# Patient Record
Sex: FEMALE | Race: BLACK OR AFRICAN AMERICAN | NOT HISPANIC OR LATINO | Employment: FULL TIME | URBAN - METROPOLITAN AREA
[De-identification: names, ages, dates, MRNs, and addresses within clinical notes are randomized per-mention and may not be internally consistent; named-entity substitution may affect disease eponyms.]

---

## 2017-02-16 ENCOUNTER — COMMUNICATION - HEALTHEAST (OUTPATIENT)
Dept: SCHEDULING | Facility: CLINIC | Age: 32
End: 2017-02-16

## 2017-02-17 ENCOUNTER — OFFICE VISIT - HEALTHEAST (OUTPATIENT)
Dept: FAMILY MEDICINE | Facility: CLINIC | Age: 32
End: 2017-02-17

## 2017-02-17 DIAGNOSIS — R11.0 NAUSEA: ICD-10-CM

## 2017-02-17 DIAGNOSIS — R42 DIZZINESS: ICD-10-CM

## 2018-10-18 ENCOUNTER — OFFICE VISIT - HEALTHEAST (OUTPATIENT)
Dept: FAMILY MEDICINE | Facility: CLINIC | Age: 33
End: 2018-10-18

## 2018-10-18 DIAGNOSIS — Z00.00 ROUTINE MEDICAL EXAM: ICD-10-CM

## 2018-10-18 DIAGNOSIS — Z23 IMMUNIZATION DUE: ICD-10-CM

## 2018-10-18 ASSESSMENT — MIFFLIN-ST. JEOR: SCORE: 1385.94

## 2018-11-26 ENCOUNTER — COMMUNICATION - HEALTHEAST (OUTPATIENT)
Dept: FAMILY MEDICINE | Facility: CLINIC | Age: 33
End: 2018-11-26

## 2018-11-30 ENCOUNTER — AMBULATORY - HEALTHEAST (OUTPATIENT)
Dept: FAMILY MEDICINE | Facility: CLINIC | Age: 33
End: 2018-11-30

## 2018-11-30 ENCOUNTER — AMBULATORY - HEALTHEAST (OUTPATIENT)
Dept: NURSING | Facility: CLINIC | Age: 33
End: 2018-11-30

## 2018-11-30 ENCOUNTER — COMMUNICATION - HEALTHEAST (OUTPATIENT)
Dept: INTERNAL MEDICINE | Facility: CLINIC | Age: 33
End: 2018-11-30

## 2018-11-30 DIAGNOSIS — Z11.1 TUBERCULOSIS SCREENING: ICD-10-CM

## 2018-11-30 DIAGNOSIS — Z11.1 SCREENING EXAMINATION FOR PULMONARY TUBERCULOSIS: ICD-10-CM

## 2018-12-03 ENCOUNTER — AMBULATORY - HEALTHEAST (OUTPATIENT)
Dept: LAB | Facility: CLINIC | Age: 33
End: 2018-12-03

## 2018-12-03 DIAGNOSIS — Z11.1 SCREENING EXAMINATION FOR PULMONARY TUBERCULOSIS: ICD-10-CM

## 2018-12-05 LAB
GAMMA INTERFERON BACKGROUND BLD IA-ACNC: 0.08 IU/ML
M TB IFN-G BLD-IMP: POSITIVE
MITOGEN IGNF BCKGRD COR BLD-ACNC: 3.17 IU/ML
MITOGEN IGNF BCKGRD COR BLD-ACNC: 3.73 IU/ML
QTF INTERPRETATION: ABNORMAL
QTF MITOGEN - NIL: 6.65 IU/ML

## 2018-12-06 ENCOUNTER — COMMUNICATION - HEALTHEAST (OUTPATIENT)
Dept: FAMILY MEDICINE | Facility: CLINIC | Age: 33
End: 2018-12-06

## 2018-12-06 DIAGNOSIS — Z11.1 SCREENING EXAMINATION FOR PULMONARY TUBERCULOSIS: ICD-10-CM

## 2018-12-06 DIAGNOSIS — R76.12 POSITIVE QUANTIFERON-TB GOLD TEST: ICD-10-CM

## 2019-03-26 ENCOUNTER — AMBULATORY - HEALTHEAST (OUTPATIENT)
Dept: NURSING | Facility: CLINIC | Age: 34
End: 2019-03-26

## 2019-03-26 DIAGNOSIS — Z23 NEED FOR VACCINATION: ICD-10-CM

## 2020-02-17 ENCOUNTER — COMMUNICATION - HEALTHEAST (OUTPATIENT)
Dept: SCHEDULING | Facility: CLINIC | Age: 35
End: 2020-02-17

## 2020-02-28 ENCOUNTER — OFFICE VISIT - HEALTHEAST (OUTPATIENT)
Dept: FAMILY MEDICINE | Facility: CLINIC | Age: 35
End: 2020-02-28

## 2020-02-28 DIAGNOSIS — Z13.220 SCREENING FOR HYPERLIPIDEMIA: ICD-10-CM

## 2020-02-28 DIAGNOSIS — Z12.4 SCREENING FOR CERVICAL CANCER: ICD-10-CM

## 2020-02-28 DIAGNOSIS — Z01.419 WELL FEMALE EXAM WITH ROUTINE GYNECOLOGICAL EXAM: ICD-10-CM

## 2020-02-28 DIAGNOSIS — Z30.44 ENCOUNTER FOR SURVEILLANCE OF VAGINAL RING HORMONAL CONTRACEPTIVE DEVICE: ICD-10-CM

## 2020-02-28 DIAGNOSIS — Z22.7 LATENT TUBERCULOSIS: ICD-10-CM

## 2020-02-28 DIAGNOSIS — Z13.0 SCREENING FOR DEFICIENCY ANEMIA: ICD-10-CM

## 2020-02-28 DIAGNOSIS — Z13.1 SCREENING FOR DIABETES MELLITUS: ICD-10-CM

## 2020-02-28 LAB
CHOLEST SERPL-MCNC: 150 MG/DL
FASTING STATUS PATIENT QL REPORTED: NO
FASTING STATUS PATIENT QL REPORTED: NO
GLUCOSE BLD-MCNC: 97 MG/DL (ref 74–125)
HDLC SERPL-MCNC: 68 MG/DL
HGB BLD-MCNC: 11.6 G/DL (ref 12–16)
LDLC SERPL CALC-MCNC: 74 MG/DL
TRIGL SERPL-MCNC: 41 MG/DL

## 2020-02-28 RX ORDER — ETONOGESTREL AND ETHINYL ESTRADIOL VAGINAL RING .015; .12 MG/D; MG/D
RING VAGINAL
Qty: 3 EACH | Refills: 4 | Status: SHIPPED | OUTPATIENT
Start: 2020-02-28 | End: 2021-09-02

## 2020-02-28 ASSESSMENT — MIFFLIN-ST. JEOR: SCORE: 1366.6

## 2020-03-02 LAB
GAMMA INTERFERON BACKGROUND BLD IA-ACNC: 0.15 IU/ML
HPV SOURCE: NORMAL
HUMAN PAPILLOMA VIRUS 16 DNA: NEGATIVE
HUMAN PAPILLOMA VIRUS 18 DNA: NEGATIVE
HUMAN PAPILLOMA VIRUS FINAL DIAGNOSIS: NORMAL
HUMAN PAPILLOMA VIRUS OTHER HR: NEGATIVE
M TB IFN-G BLD-IMP: POSITIVE
MITOGEN IGNF BCKGRD COR BLD-ACNC: 1.87 IU/ML
MITOGEN IGNF BCKGRD COR BLD-ACNC: 1.91 IU/ML
QTF INTERPRETATION: ABNORMAL
QTF MITOGEN - NIL: 4.31 IU/ML
SPECIMEN DESCRIPTION: NORMAL

## 2020-03-06 LAB
BKR LAB AP ABNORMAL BLEEDING: NO
BKR LAB AP BIRTH CONTROL/HORMONES: NORMAL
BKR LAB AP CERVICAL APPEARANCE: NORMAL
BKR LAB AP GYN ADEQUACY: NORMAL
BKR LAB AP GYN INTERPRETATION: NORMAL
BKR LAB AP HPV REFLEX: NORMAL
BKR LAB AP LMP: NORMAL
BKR LAB AP PATIENT STATUS: NORMAL
BKR LAB AP PREVIOUS ABNORMAL: NO
BKR LAB AP PREVIOUS NORMAL: 2013
HIGH RISK?: NO
PATH REPORT.COMMENTS IMP SPEC: NORMAL
RESULT FLAG (HE HISTORICAL CONVERSION): NORMAL

## 2020-03-19 ENCOUNTER — COMMUNICATION - HEALTHEAST (OUTPATIENT)
Dept: HEALTH INFORMATION MANAGEMENT | Facility: CLINIC | Age: 35
End: 2020-03-19

## 2020-03-20 ENCOUNTER — COMMUNICATION - HEALTHEAST (OUTPATIENT)
Dept: FAMILY MEDICINE | Facility: CLINIC | Age: 35
End: 2020-03-20

## 2020-04-22 ENCOUNTER — RECORDS - HEALTHEAST (OUTPATIENT)
Dept: ADMINISTRATIVE | Facility: OTHER | Age: 35
End: 2020-04-22

## 2020-04-23 ENCOUNTER — RECORDS - HEALTHEAST (OUTPATIENT)
Dept: ADMINISTRATIVE | Facility: OTHER | Age: 35
End: 2020-04-23

## 2020-05-28 ENCOUNTER — RECORDS - HEALTHEAST (OUTPATIENT)
Dept: ADMINISTRATIVE | Facility: OTHER | Age: 35
End: 2020-05-28

## 2020-07-07 ENCOUNTER — COMMUNICATION - HEALTHEAST (OUTPATIENT)
Dept: SCHEDULING | Facility: CLINIC | Age: 35
End: 2020-07-07

## 2020-07-09 ENCOUNTER — COMMUNICATION - HEALTHEAST (OUTPATIENT)
Dept: FAMILY MEDICINE | Facility: CLINIC | Age: 35
End: 2020-07-09

## 2020-07-10 ENCOUNTER — COMMUNICATION - HEALTHEAST (OUTPATIENT)
Dept: ADMINISTRATIVE | Facility: CLINIC | Age: 35
End: 2020-07-10

## 2020-09-09 ENCOUNTER — COMMUNICATION - HEALTHEAST (OUTPATIENT)
Dept: SCHEDULING | Facility: CLINIC | Age: 35
End: 2020-09-09

## 2021-05-30 VITALS — WEIGHT: 155.2 LBS | BODY MASS INDEX: 25.83 KG/M2

## 2021-06-02 ENCOUNTER — RECORDS - HEALTHEAST (OUTPATIENT)
Dept: ADMINISTRATIVE | Facility: CLINIC | Age: 36
End: 2021-06-02

## 2021-06-02 VITALS — WEIGHT: 158.06 LBS | BODY MASS INDEX: 26.98 KG/M2 | HEIGHT: 64 IN

## 2021-06-04 VITALS
SYSTOLIC BLOOD PRESSURE: 104 MMHG | HEART RATE: 60 BPM | BODY MASS INDEX: 26.26 KG/M2 | RESPIRATION RATE: 17 BRPM | WEIGHT: 153.8 LBS | HEIGHT: 64 IN | DIASTOLIC BLOOD PRESSURE: 56 MMHG

## 2021-06-06 NOTE — TELEPHONE ENCOUNTER
New Appointment Needed  What is the reason for the visit:    Mantoux Placement  Appt Request  What is the purpose of the mantoux?:  School: [employer]  Is there a form to be completed?:   No  How soon do you need the mantoux placed?:  date: asap    Provider Preference: Any available  How soon do you need to be seen?: tomorrow  Waitlist offered?: Yes  Okay to leave a detailed message:  Yes
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
## 2021-06-06 NOTE — TELEPHONE ENCOUNTER
Left message to call back for: Unable to leave vm, Phone number listed is currently not taking any calls at the moment    Information to relay to patient:  If Pt calls back, please help the Pt schedule an appointment either OV or PHY with a provider.  Pt needs to see a provider in order to get TB test completed. Pt has not seen a provider since 10/18/2018.

## 2021-06-08 NOTE — PROGRESS NOTES
Assessment:     1. Dizziness     2. Nausea  Pregnancy (Beta-hCG, Qual), Urine     Results for orders placed or performed in visit on 02/17/17   Pregnancy (Beta-hCG, Qual), Urine   Result Value Ref Range    Pregnancy Test, Urine Negative Negative    Specific Gravity, UA 1.010 1.001 - 1.030     Normal neuro exam, neg ar hallpike. No medications. No HA currently, not concerning for ACS or stroke. She has had nasal congestion recently and I suspect this is potentially the source of dizziness, low grade temps and maybe nausea.      Plan:   Dizziness, Headaches  Reassured she is not pregnant  Push fluids  Recommend hot steamy shower, saline nasal spray to lessen congestion  Follow up with Dr. Frost in 1 wk if symptoms are ongoing or sooner if any new or worsening symptoms.       Subjective:       Kali Laureano is a 32 y.o. female who presents for evaluation of nausea, dizziness, low grade temps with HA.  Onset of symptoms was 3 weeks ago, and have been unchanged since that time. She has taken tylenol with some relief. No HA currently.  Associated symptoms include mild nasal congestion. She felt the room was spinning around her yesterday morning. Lasted about 30 min. No recurrence. She denies other URI symptoms, ear symptoms, paresthesia, weakness, unstable gait, vision or speech changes, vomiting, chest pain, cough, SOB, dyspnea. No medication changes. No falls, head trauma. She is very concerned she may be pregnant. She does have regular periods, uses the nuvaring. Has glasses to wear but uses minimally.     Review of Systems  Pertinent items are noted in HPI.      Objective:        Visit Vitals     /80     Pulse 71     Temp 98  F (36.7  C) (Oral)     Resp 12     Wt 155 lb 3.2 oz (70.4 kg)     SpO2 100%     Breastfeeding No     BMI 25.83 kg/m2     General appearance: alert, appears stated age and cooperative  Head: Normocephalic, without obvious abnormality, atraumatic  Eyes: conjunctivae/corneas clear. PERRL,  EOM's intact. Fundi benign.  Ears: normal TM's and external ear canals both ears  Nose: Nares normal. Septum midline. Mucosa normal. No drainage or sinus tenderness.  Throat: lips, mucosa, and tongue normal; teeth and gums normal  Neck: no adenopathy and supple, symmetrical, trachea midline  Lungs: clear to auscultation bilaterally  Heart: regular rate and rhythm, S1, S2 normal, no murmur, click, rub or gallop  Skin: Skin color, texture, turgor normal. No rashes or lesions  Neurologic: Alert and oriented X 3, normal strength and tone. Normal symmetric reflexes. Normal coordination and gait   Trena-Hallpike does not lateralize

## 2021-06-09 NOTE — TELEPHONE ENCOUNTER
Patient came into clinic today looking for form.  She had received a call that it was completed and ready for pick-up.  The call was not documented and at first we still did not know where the form was.  We finally found her form (uncompleted), so I asked Dr. Baires to look it over and sign it.  Form was completed and given to patient (after a copy was made).    Neelam Schwab, CMA

## 2021-06-09 NOTE — TELEPHONE ENCOUNTER
Who is calling:  Kali Schuster  Reason for Call:  Pt states she needs her immunizations printed separately and signed by Provider.  Date of last appointment with primary care: 2/28/2020  Okay to leave a detailed message: Yes         [place]

## 2021-06-09 NOTE — TELEPHONE ENCOUNTER
Patient dropped off College [employer] Exam form to be completed by Fariha. Given to Hallie JACKSON to send to [employer]ariha's [employer] fax (put in Hallie mail box). Please call patient when from is ready to be picked up.

## 2021-06-09 NOTE — TELEPHONE ENCOUNTER
I thought I already took care of this.    I reprinted everything she is asking for and I will have DE sign them tomorrow.    Neelam Schwab, CMA

## 2021-06-09 NOTE — TELEPHONE ENCOUNTER
Patient was given copies of Last PHY form and IMM records yesterday. Patient states she needs these signed along with the order for her last MMR printed and signed. Can patient go to the [facility] and get this done today? I can email Neelam or DE the MMMR order she needs signed and then just print her last PHY to be signed.

## 2021-06-09 NOTE — TELEPHONE ENCOUNTER
Patient states she dropped off PHY form to be signed at the [facility] last Friday. Please check with DE and Neelam. Have they seen these? Neelam please see Telephone message from 07/09/2020. Thank you.

## 2021-06-09 NOTE — TELEPHONE ENCOUNTER
Left message to call back for: Kali    Information to relay to patient:  LMTCB    Please assist patient in scheduling appt requested

## 2021-06-09 NOTE — TELEPHONE ENCOUNTER
Called and left message for patient stating the forms are signed by Dr. Shobha Robles and will be waiting for her at the  at the [facility] clinic.    If she wants them faxed or mailed, she can call back and let us know.    Neelam Schwab, CMA

## 2021-06-09 NOTE — TELEPHONE ENCOUNTER
Left detailed message that patient may go to [place] to . I emailed the order that she needs to Neelam. DE to sign that and her last PHY printed and signed too.

## 2021-06-09 NOTE — TELEPHONE ENCOUNTER
New Appointment Needed  What is the reason for the visit:    Patient wants an in office visit to do some immunizations for school. Please call her and make appointment with her.. Ok to leave a detail message if she does not answer    [phone]

## 2021-06-09 NOTE — TELEPHONE ENCOUNTER
Spoke with patient and she just needs documentation of her Physical and Immunization record. Printed. She will  at 4:15 today.

## 2021-06-11 NOTE — TELEPHONE ENCOUNTER
According to the provided information, she has received two doses of the vaccination, and it is now complete.

## 2021-06-11 NOTE — TELEPHONE ENCOUNTER
Who is calling:  Patient   Reason for Call:  Caller is needing to know if she needs to come back for another MMR immunization or not. Caller stated that she was not told to.   Date of last appointment with primary care:   Okay to leave a detailed message: Yes

## 2021-06-11 NOTE — TELEPHONE ENCOUNTER
Not sure what the form is but print out of immunizations report is printed and placed at [facility] for

## 2021-06-11 NOTE — TELEPHONE ENCOUNTER
Patient Returning Call  Reason for call:  Return call   Information relayed to patient:  Caller was informed of message below.   Patient has additional questions:  Yes  If YES, what are your questions/concerns:  Caller would like to  form in clinic to please have it ready today, caller is needing it for her school. Caller would like a call back once its ready.   Okay to leave a detailed message?: Yes

## 2021-06-17 NOTE — TELEPHONE ENCOUNTER
Telephone Encounter by Vidhya Houser CMA at 9/9/2020  3:50 PM     Author: Vidhya Houser CMA Service: -- Author Type: Certified Medical Assistant    Filed: 9/9/2020  3:51 PM Encounter Date: 9/9/2020 Status: Signed    : Vidhya Houser CMA (Certified Medical Assistant)
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
## 2021-08-21 ENCOUNTER — HEALTH MAINTENANCE LETTER (OUTPATIENT)
Age: 36
End: 2021-08-21

## 2021-09-02 ENCOUNTER — OFFICE VISIT (OUTPATIENT)
Dept: FAMILY MEDICINE | Facility: CLINIC | Age: 36
End: 2021-09-02
Payer: COMMERCIAL

## 2021-09-02 VITALS
HEART RATE: 63 BPM | WEIGHT: 172.8 LBS | BODY MASS INDEX: 29.43 KG/M2 | OXYGEN SATURATION: 100 % | DIASTOLIC BLOOD PRESSURE: 60 MMHG | SYSTOLIC BLOOD PRESSURE: 110 MMHG

## 2021-09-02 DIAGNOSIS — N76.0 BACTERIAL VAGINOSIS: Primary | ICD-10-CM

## 2021-09-02 DIAGNOSIS — B96.89 BACTERIAL VAGINOSIS: Primary | ICD-10-CM

## 2021-09-02 DIAGNOSIS — N89.8 VAGINAL DISCHARGE: ICD-10-CM

## 2021-09-02 DIAGNOSIS — Z11.3 SCREEN FOR STD (SEXUALLY TRANSMITTED DISEASE): ICD-10-CM

## 2021-09-02 LAB
CLUE CELLS: PRESENT
TRICHOMONAS, WET PREP: ABNORMAL
WBC'S/HIGH POWER FIELD, WET PREP: ABNORMAL
YEAST, WET PREP: ABNORMAL

## 2021-09-02 PROCEDURE — 87491 CHLMYD TRACH DNA AMP PROBE: CPT | Performed by: NURSE PRACTITIONER

## 2021-09-02 PROCEDURE — 87210 SMEAR WET MOUNT SALINE/INK: CPT | Performed by: NURSE PRACTITIONER

## 2021-09-02 PROCEDURE — 87591 N.GONORRHOEAE DNA AMP PROB: CPT | Performed by: NURSE PRACTITIONER

## 2021-09-02 PROCEDURE — 99214 OFFICE O/P EST MOD 30 MIN: CPT | Performed by: NURSE PRACTITIONER

## 2021-09-02 RX ORDER — METRONIDAZOLE 7.5 MG/G
1 GEL VAGINAL DAILY
Qty: 25 G | Refills: 0 | Status: SHIPPED | OUTPATIENT
Start: 2021-09-02 | End: 2021-09-07

## 2021-09-02 NOTE — PROGRESS NOTES
Assessment and Plan:       ICD-10-CM    1. Bacterial vaginosis  N76.0 metroNIDAZOLE (METROGEL) 0.75 % vaginal gel    B96.89    2. Vaginal discharge  N89.8 Wet prep - Clinic Collect   3. Screen for STD (sexually transmitted disease)  Z11.3 Neisseria gonorrhoeae PCR - Clinic Collect     Chlamydia trachomatis PCR - Clinic Collect     Wet prep is positive for clue cells.  Will treat with MetroGel for 5 nights.  Educated on its indications and side effects.  Will notify patient of STD screening results.  Discussed safe sex practices.  She is content with the plan.    Subjective:     Kali is a 36 year old female presenting to the clinic for concerns for possible vaginal infection.  Patient has been experiencing vaginal itching and discharge for 1 week.  Discharge is white in color.  She states the discharge is thin without an odor.  Her last menstrual period was 3 weeks ago.  She has been  for 13 years.  She would like STD screening today.  He has not tried any over-the-counter products for her symptoms.  She feels as though her symptoms are worsening.    Reviewof Systems: A complete 14 point review of systems was obtained and is negative or as stated in the history of present illness.    Social History     Socioeconomic History     Marital status:      Spouse name: Not on file     Number of children: Not on file     Years of education: Not on file     Highest education level: Not on file   Occupational History     Not on file   Tobacco Use     Smoking status: Never Smoker     Smokeless tobacco: Never Used   Substance and Sexual Activity     Alcohol use: No     Drug use: No     Sexual activity: Yes     Partners: Male     Birth control/protection: Other     Comment: ring   Other Topics Concern     Not on file   Social History Narrative     Not on file     Social Determinants of Health     Financial Resource Strain:      Difficulty of Paying Living Expenses:    Food Insecurity:      Worried About Running Out  of Food in the Last Year:      Ran Out of Food in the Last Year:    Transportation Needs:      Lack of Transportation (Medical):      Lack of Transportation (Non-Medical):    Physical Activity:      Days of Exercise per Week:      Minutes of Exercise per Session:    Stress:      Feeling of Stress :    Social Connections:      Frequency of Communication with Friends and Family:      Frequency of Social Gatherings with Friends and Family:      Attends [employer] Services:      Active Member of Clubs or Organizations:      Attends Club or Organization Meetings:      Marital Status:    Intimate Partner Violence:      Fear of Current or Ex-Partner:      Emotionally Abused:      Physically Abused:      Sexually Abused:        Active Ambulatory Problems     Diagnosis Date Noted     Latent tuberculosis 04/09/2015     Resolved Ambulatory Problems     Diagnosis Date Noted     Active labor at term 06/22/2014     Normal delivery 06/23/2014     Past Medical History:   Diagnosis Date     Mental disorder      Postpartum depression      Trauma        Family History   Problem Relation Age of Onset     Hypertension Mother        Objective:     /60 (BP Location: Right arm, Patient Position: Sitting, Cuff Size: Adult Regular)   Pulse 63   Wt 78.4 kg (172 lb 12.8 oz)   SpO2 100%   BMI 29.43 kg/m      Patient is alert, in no obvious distress.   Skin: Warm, dry.    Lungs:  Clear to auscultation. Respirations even and unlabored.  No wheezing or rales noted.   Heart:  Regular rate and rhythm.  No murmurs.   Abdomen: Soft, nontender.  No organomegaly. Bowel sounds normoactive. No guarding or masses noted.   :  External genitalia normal.  Normal vaginal mucosa.  Slight white vaginal discharge is present.     LABORATORY: Wet prep, gonorrhea, chlamydia ordered.

## 2021-09-04 LAB
C TRACH DNA SPEC QL NAA+PROBE: NEGATIVE
N GONORRHOEA DNA SPEC QL NAA+PROBE: NEGATIVE

## 2021-10-16 ENCOUNTER — HEALTH MAINTENANCE LETTER (OUTPATIENT)
Age: 36
End: 2021-10-16

## 2021-11-12 ENCOUNTER — OFFICE VISIT (OUTPATIENT)
Dept: FAMILY MEDICINE | Facility: CLINIC | Age: 36
End: 2021-11-12
Payer: COMMERCIAL

## 2021-11-12 VITALS
SYSTOLIC BLOOD PRESSURE: 116 MMHG | DIASTOLIC BLOOD PRESSURE: 71 MMHG | OXYGEN SATURATION: 99 % | TEMPERATURE: 98.2 F | HEART RATE: 86 BPM

## 2021-11-12 DIAGNOSIS — K59.00 CONSTIPATION, UNSPECIFIED CONSTIPATION TYPE: Primary | ICD-10-CM

## 2021-11-12 DIAGNOSIS — N89.8 VAGINAL ITCHING: ICD-10-CM

## 2021-11-12 DIAGNOSIS — R10.32 LLQ ABDOMINAL PAIN: ICD-10-CM

## 2021-11-12 LAB
ALBUMIN UR-MCNC: NEGATIVE MG/DL
APPEARANCE UR: CLEAR
BACTERIA #/AREA URNS HPF: ABNORMAL /HPF
BILIRUB UR QL STRIP: NEGATIVE
CLUE CELLS: ABNORMAL
COLOR UR AUTO: YELLOW
GLUCOSE UR STRIP-MCNC: NEGATIVE MG/DL
HGB UR QL STRIP: ABNORMAL
KETONES UR STRIP-MCNC: NEGATIVE MG/DL
LEUKOCYTE ESTERASE UR QL STRIP: NEGATIVE
NITRATE UR QL: NEGATIVE
PH UR STRIP: 6 [PH] (ref 5–8)
RBC #/AREA URNS AUTO: ABNORMAL /HPF
SP GR UR STRIP: >=1.03 (ref 1–1.03)
SQUAMOUS #/AREA URNS AUTO: ABNORMAL /LPF
TRICHOMONAS, WET PREP: ABNORMAL
UROBILINOGEN UR STRIP-ACNC: 0.2 E.U./DL
WBC #/AREA URNS AUTO: ABNORMAL /HPF
WBC'S/HIGH POWER FIELD, WET PREP: ABNORMAL
YEAST, WET PREP: ABNORMAL

## 2021-11-12 PROCEDURE — 99214 OFFICE O/P EST MOD 30 MIN: CPT | Performed by: PHYSICIAN ASSISTANT

## 2021-11-12 PROCEDURE — 87210 SMEAR WET MOUNT SALINE/INK: CPT | Performed by: PHYSICIAN ASSISTANT

## 2021-11-12 PROCEDURE — 81001 URINALYSIS AUTO W/SCOPE: CPT | Performed by: PHYSICIAN ASSISTANT

## 2021-11-12 RX ORDER — POLYETHYLENE GLYCOL 3350 17 G/17G
1 POWDER, FOR SOLUTION ORAL DAILY
Qty: 289 G | Refills: 3 | Status: SHIPPED | OUTPATIENT
Start: 2021-11-12

## 2021-11-12 RX ORDER — MULTIVIT-MIN/IRON/FOLIC ACID/K 18-600-40
CAPSULE ORAL
COMMUNITY

## 2021-11-12 NOTE — PROGRESS NOTES
Assessment & Plan     Vaginal itching  Reassured of normal Wet prep. No futher treatment at this time.  Avoid over cleaning, irritants. Follow-up for persistent or worsening sx.    - Wet prep - Clinic Collect    Constipation, unspecified constipation type  mirlax as ordered. Recommend she stay on the mirlax for at least 1 month.  Discussed fiber and conservative measures, exercise.  Follow-up with pcp for persistent or worsening sx in the next 1 month.    - XR Abdomen 2 Views  - XR Abdomen 2 Views  - polyethylene glycol (MIRALAX) 17 GM/Dose powder  Dispense: 289 g; Refill: 3    LLQ abdominal pain:   - XR Abdomen 2 Views  - XR Abdomen 2 Views  - UA macro with reflex to Microscopic and Culture - Clinc Collect  - Urine Microscopic     Leighann Spencer PA-C  [facility]    Bernarda Bass is a 36 year old female who presents to clinic today for the following health issues:  Chief Complaint   Patient presents with     Vaginal Problem     Still have itch from last OV     Constipation     constipation-- sharp pain on left side of abdomen     HPI: pt concerns re: LLQ discomfort, intermittently for the last few weeks.      Sitting long periods of time, not active due to studying for boards.    Having constipation.  Needs to push and strain. Sometimes very small pellet like stools, othertimes normal size does not feel completely empties.   Sharp pain.    Comes and goes.    1 BM per 1-3 days.    Detox tea is helpful for stooling, increased fiber not helpful.  Relief in pain after BM.    No blood.    Does not wake at night time.    Vaginal : no sx now.    No urinary frequency, urgency, hematuria.    No nausea, vomiting, fevers or cihlls.    Today pain is mild.          Review of Systems  Constitutional, HEENT, cardiovascular, pulmonary, gi and gu systems are negative, except as otherwise noted.      Objective    /71   Pulse 86   Temp 98.2  F (36.8  C) (Oral)   SpO2 99%   Physical Exam    Pt is in acute distress and appears well  Neck supple, no adenopathy  Lungs: CTA without wheezes, rhonchi or rales.  Heart: RRR, no murmur, heaves or thrills.    Abdomen: BS active, soft, nondistended, nontender to light or deep palpation. No rebound or peritoneal signs. No masses or hsm.    Results for orders placed or performed in visit on 11/12/21   XR Abdomen 2 Views     Status: None    Narrative    EXAM DATE:         11/12/2021    EXAM: X-RAY ABDOMEN, 2 VIEWS  LOCATION: [facility]  DATE/TIME: 11/12/2021 4:30 PM    INDICATION: Left sided abdominal pain  COMPARISON: None.    IMPRESSION: Large amount stool in colon. Normal amount stool in rectum. Nonobstructive gas pattern, and no free air.    There are tubular and circular objects projecting over the lower abdomen and pelvis. The precise location of these is not known.             UA macro with reflex to Microscopic and Culture - Clinc Collect     Status: Abnormal    Specimen: Urine, Clean Catch   Result Value Ref Range    Color Urine Yellow Colorless, Straw, Light Yellow, Yellow    Appearance Urine Clear Clear    Glucose Urine Negative Negative mg/dL    Bilirubin Urine Negative Negative    Ketones Urine Negative Negative mg/dL    Specific Gravity Urine >=1.030 1.005 - 1.030    Blood Urine Small (A) Negative    pH Urine 6.0 5.0 - 8.0    Protein Albumin Urine Negative Negative mg/dL    Urobilinogen Urine 0.2 0.2, 1.0 E.U./dL    Nitrite Urine Negative Negative    Leukocyte Esterase Urine Negative Negative   Urine Microscopic     Status: Abnormal   Result Value Ref Range    Bacteria Urine None Seen None Seen /HPF    RBC Urine 0-2 0-2 /HPF /HPF    WBC Urine None Seen 0-5 /HPF /HPF    Squamous Epithelials Urine Few (A) None Seen /LPF    Narrative    Urine Culture not indicated   Wet prep - Clinic Collect     Status: Abnormal    Specimen: Vagina; Swab   Result Value Ref Range    Trichomonas Absent Absent    Yeast Absent Absent    Clue  Cells Absent Absent    WBCs/high power field 1+ (A) None

## 2021-11-13 NOTE — RESULT ENCOUNTER NOTE
Provider was able to get a hold of patient and she is aware of all her lab results.     Sulaiman Muñoz, Medical Assistant

## 2022-09-25 ENCOUNTER — HEALTH MAINTENANCE LETTER (OUTPATIENT)
Age: 37
End: 2022-09-25

## 2023-10-14 ENCOUNTER — HEALTH MAINTENANCE LETTER (OUTPATIENT)
Age: 38
End: 2023-10-14

## 2024-02-29 ENCOUNTER — OFFICE VISIT (OUTPATIENT)
Dept: FAMILY MEDICINE | Facility: CLINIC | Age: 39
End: 2024-02-29
Payer: COMMERCIAL

## 2024-02-29 VITALS
TEMPERATURE: 98.9 F | DIASTOLIC BLOOD PRESSURE: 84 MMHG | SYSTOLIC BLOOD PRESSURE: 125 MMHG | HEART RATE: 80 BPM | OXYGEN SATURATION: 99 % | RESPIRATION RATE: 20 BRPM

## 2024-02-29 DIAGNOSIS — G43.909 MIGRAINE WITHOUT STATUS MIGRAINOSUS, NOT INTRACTABLE, UNSPECIFIED MIGRAINE TYPE: ICD-10-CM

## 2024-02-29 DIAGNOSIS — R51.9 NONINTRACTABLE EPISODIC HEADACHE, UNSPECIFIED HEADACHE TYPE: Primary | ICD-10-CM

## 2024-02-29 PROCEDURE — 99213 OFFICE O/P EST LOW 20 MIN: CPT | Performed by: FAMILY MEDICINE

## 2024-02-29 RX ORDER — ACETAMINOPHEN 325 MG/1
325-650 TABLET ORAL
COMMUNITY

## 2024-02-29 RX ORDER — SUMATRIPTAN 50 MG/1
50 TABLET, FILM COATED ORAL
Qty: 20 TABLET | Refills: 0 | Status: SHIPPED | OUTPATIENT
Start: 2024-02-29

## 2024-02-29 NOTE — PATIENT INSTRUCTIONS
Take prescribed medication as directed.    Think about work hours.    Establish with a primary doctor.

## 2024-02-29 NOTE — PROGRESS NOTES
(R51.9) Nonintractable episodic headache, unspecified headache type  (primary encounter diagnosis)  Comment:   Plan:     (G45.747) Migraine without status migrainosus, not intractable, unspecified migraine type  Comment:   Plan: SUMAtriptan (IMITREX) 50 MG tablet            Discussion:  Episodic headaches which are similar along with positive family history would be suggestive of possible migraine phenomenon.  Stress and overwork may be a triggering factor.  I asked her to try Imitrex at onset of headache but also establish primary care follow-up.  She is also considering adjusting her work hours.      CHIEF COMPLAINT    Headaches.      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  She is .  Has 3 children at home.  Does the cooking and housework.    She is describing headaches since she was a teenager.  They tend to begin in the frontal area and then become more generalized.  They are sometimes accompanied by body aches.  No numbness or weakness or vomiting.    Her mother has headaches.      REVIEW OF SYSTEMS    No fevers.  No head congestion or sore throat.  No cough or SOB.  No chest pain, palpitations, edema.  No abdominal pain or vomiting.      EXAM  /84   Pulse 80   Temp 98.9  F (37.2  C) (Tympanic)   Resp 20   LMP 02/08/2024 (Exact Date)   SpO2 99%     Well-appearing woman.  No facial asymmetry.  PERRL, EOMI.   Pharynx WNL.  Neck without adenopathy.  Motor, fine motor, speech WNL.  Romberg negative.

## 2024-03-02 ENCOUNTER — HEALTH MAINTENANCE LETTER (OUTPATIENT)
Age: 39
End: 2024-03-02

## 2024-03-27 DIAGNOSIS — G43.909 MIGRAINE WITHOUT STATUS MIGRAINOSUS, NOT INTRACTABLE, UNSPECIFIED MIGRAINE TYPE: ICD-10-CM

## 2024-03-27 RX ORDER — SUMATRIPTAN 50 MG/1
50 TABLET, FILM COATED ORAL
Qty: 20 TABLET | Refills: 0 | OUTPATIENT
Start: 2024-03-27

## 2024-03-28 NOTE — TELEPHONE ENCOUNTER
Spoke with patient and she had not requested medication be filled. She is looking for a new primary doctor.

## 2024-12-07 ENCOUNTER — HEALTH MAINTENANCE LETTER (OUTPATIENT)
Age: 39
End: 2024-12-07

## 2025-07-14 ENCOUNTER — TELEPHONE (OUTPATIENT)
Dept: FAMILY MEDICINE | Facility: CLINIC | Age: 40
End: 2025-07-14
Payer: COMMERCIAL

## 2025-07-14 NOTE — TELEPHONE ENCOUNTER
Contacts       Contact Date/Time Type Contact Phone/Fax    2025 03:56 PM CDT Phone (Outgoing) Kali Case (Self)     Left Message           Attempted to reach patient to: Relay a message    Regardin/15 appointment    Action to take: OK to relay (verbatim): please remind patient of appointment on 7/15 and to allow for extra travel time due to construction in the area.

## 2025-07-15 ENCOUNTER — OFFICE VISIT (OUTPATIENT)
Dept: FAMILY MEDICINE | Facility: CLINIC | Age: 40
End: 2025-07-15
Payer: COMMERCIAL

## 2025-07-15 VITALS
HEART RATE: 72 BPM | HEIGHT: 64 IN | OXYGEN SATURATION: 98 % | WEIGHT: 174 LBS | TEMPERATURE: 97.7 F | RESPIRATION RATE: 14 BRPM | DIASTOLIC BLOOD PRESSURE: 70 MMHG | BODY MASS INDEX: 29.71 KG/M2 | SYSTOLIC BLOOD PRESSURE: 100 MMHG

## 2025-07-15 DIAGNOSIS — Z13.6 SCREENING FOR CARDIOVASCULAR CONDITION: ICD-10-CM

## 2025-07-15 DIAGNOSIS — Z13.1 SCREENING FOR DIABETES MELLITUS: ICD-10-CM

## 2025-07-15 DIAGNOSIS — Z00.00 ANNUAL PHYSICAL EXAM: Primary | ICD-10-CM

## 2025-07-15 DIAGNOSIS — Z12.4 CERVICAL CANCER SCREENING: ICD-10-CM

## 2025-07-15 DIAGNOSIS — N97.9 FEMALE INFERTILITY: ICD-10-CM

## 2025-07-15 DIAGNOSIS — Z12.31 VISIT FOR SCREENING MAMMOGRAM: ICD-10-CM

## 2025-07-15 PROBLEM — R73.03 PRE-DIABETES: Status: ACTIVE | Noted: 2025-07-15

## 2025-07-15 LAB
CHOLEST SERPL-MCNC: 166 MG/DL
EST. AVERAGE GLUCOSE BLD GHB EST-MCNC: 120 MG/DL
FASTING STATUS PATIENT QL REPORTED: YES
HBA1C MFR BLD: 5.8 % (ref 0–5.6)
HDLC SERPL-MCNC: 76 MG/DL
LDLC SERPL CALC-MCNC: 83 MG/DL
NONHDLC SERPL-MCNC: 90 MG/DL
TRIGL SERPL-MCNC: 36 MG/DL

## 2025-07-15 PROCEDURE — 80061 LIPID PANEL: CPT | Performed by: PHYSICIAN ASSISTANT

## 2025-07-15 PROCEDURE — 3074F SYST BP LT 130 MM HG: CPT | Performed by: PHYSICIAN ASSISTANT

## 2025-07-15 PROCEDURE — 36415 COLL VENOUS BLD VENIPUNCTURE: CPT | Performed by: PHYSICIAN ASSISTANT

## 2025-07-15 PROCEDURE — 83036 HEMOGLOBIN GLYCOSYLATED A1C: CPT | Performed by: PHYSICIAN ASSISTANT

## 2025-07-15 PROCEDURE — 87624 HPV HI-RISK TYP POOLED RSLT: CPT | Performed by: PHYSICIAN ASSISTANT

## 2025-07-15 PROCEDURE — 99396 PREV VISIT EST AGE 40-64: CPT | Performed by: PHYSICIAN ASSISTANT

## 2025-07-15 PROCEDURE — 3078F DIAST BP <80 MM HG: CPT | Performed by: PHYSICIAN ASSISTANT

## 2025-07-15 PROCEDURE — 99459 PELVIC EXAMINATION: CPT | Performed by: PHYSICIAN ASSISTANT

## 2025-07-15 SDOH — HEALTH STABILITY: PHYSICAL HEALTH: ON AVERAGE, HOW MANY DAYS PER WEEK DO YOU ENGAGE IN MODERATE TO STRENUOUS EXERCISE (LIKE A BRISK WALK)?: 5 DAYS

## 2025-07-15 ASSESSMENT — SOCIAL DETERMINANTS OF HEALTH (SDOH): HOW OFTEN DO YOU GET TOGETHER WITH FRIENDS OR RELATIVES?: ONCE A WEEK

## 2025-07-15 NOTE — PROGRESS NOTES
Prior to immunization administration, verified patients identity using patient s name and date of birth. Please see Immunization Activity for additional information.     Screening Questionnaire for Adult Immunization    Are you sick today?   No   Do you have allergies to medications, food, a vaccine component or latex?   No   Have you ever had a serious reaction after receiving a vaccination?   No   Do you have a long-term health problem with heart, lung, kidney, or metabolic disease (e.g., diabetes), asthma, a blood disorder, no spleen, complement component deficiency, a cochlear implant, or a spinal fluid leak?  Are you on long-term aspirin therapy?   No   Do you have cancer, leukemia, HIV/AIDS, or any other immune system problem?   No   Do you have a parent, brother, or sister with an immune system problem?   No   In the past 3 months, have you taken medications that affect  your immune system, such as prednisone, other steroids, or anticancer drugs; drugs for the treatment of rheumatoid arthritis, Crohn s disease, or psoriasis; or have you had radiation treatments?   No   Have you had a seizure, or a brain or other nervous system problem?   No   During the past year, have you received a transfusion of blood or blood    products, or been given immune (gamma) globulin or antiviral drug?   No   For women: Are you pregnant or is there a chance you could become       pregnant during the next month?   No   Have you received any vaccinations in the past 4 weeks?   No     Immunization questionnaire answers were all negative.      Patient instructed to remain in clinic for 15 minutes afterwards, and to report any adverse reactions.     Screening performed by Cyrus Vasquez MA on 7/15/2025 at 10:05 AM.
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
## 2025-07-15 NOTE — PATIENT INSTRUCTIONS
Patient Education   Preventive Care Advice   This is general advice given by our system to help you stay healthy. However, your care team may have specific advice just for you. Please talk to your care team about your preventive care needs.  Nutrition  Eat 5 or more servings of fruits and vegetables each day.  Try wheat bread, brown rice and whole grain pasta (instead of white bread, rice, and pasta).  Get enough calcium and vitamin D. Check the label on foods and aim for 100% of the RDA (recommended daily allowance).  Lifestyle  Exercise at least 150 minutes each week  (30 minutes a day, 5 days a week).  Do muscle strengthening activities 2 days a week. These help control your weight and prevent disease.  No smoking.  Wear sunscreen to prevent skin cancer.  Have a dental exam and cleaning every 6 months.  Yearly exams  See your health care team every year to talk about:  Any changes in your health.  Any medicines your care team has prescribed.  Preventive care, family planning, and ways to prevent chronic diseases.  Shots (vaccines)   HPV shots (up to age 26), if you've never had them before.  Hepatitis B shots (up to age 59), if you've never had them before.  COVID-19 shot: Get this shot when it's due.  Flu shot: Get a flu shot every year.  Tetanus shot: Get a tetanus shot every 10 years.  Pneumococcal, hepatitis A, and RSV shots: Ask your care team if you need these based on your risk.  Shingles shot (for age 50 and up)  General health tests  Diabetes screening:  Starting at age 35, Get screened for diabetes at least every 3 years.  If you are younger than age 35, ask your care team if you should be screened for diabetes.  Cholesterol test: At age 39, start having a cholesterol test every 5 years, or more often if advised.  Bone density scan (DEXA): At age 50, ask your care team if you should have this scan for osteoporosis (brittle bones).  Hepatitis C: Get tested at least once in your life.  STIs (sexually  transmitted infections)  Before age 24: Ask your care team if you should be screened for STIs.  After age 24: Get screened for STIs if you're at risk. You are at risk for STIs (including HIV) if:  You are sexually active with more than one person.  You don't use condoms every time.  You or a partner was diagnosed with a sexually transmitted infection.  If you are at risk for HIV, ask about PrEP medicine to prevent HIV.  Get tested for HIV at least once in your life, whether you are at risk for HIV or not.  Cancer screening tests  Cervical cancer screening: If you have a cervix, begin getting regular cervical cancer screening tests starting at age 21.  Breast cancer scan (mammogram): If you've ever had breasts, begin having regular mammograms starting at age 40. This is a scan to check for breast cancer.  Colon cancer screening: It is important to start screening for colon cancer at age 45.  Have a colonoscopy test every 10 years (or more often if you're at risk) Or, ask your provider about stool tests like a FIT test every year or Cologuard test every 3 years.  To learn more about your testing options, visit:   .  For help making a decision, visit:   https://bit.ly/xm61566.  Prostate cancer screening test: If you have a prostate, ask your care team if a prostate cancer screening test (PSA) at age 55 is right for you.  Lung cancer screening: If you are a current or former smoker ages 50 to 80, ask your care team if ongoing lung cancer screenings are right for you.  For informational purposes only. Not to replace the advice of your health care provider. Copyright   2023 [employer]. All rights reserved. Clinically reviewed by the [facility] Transitions Program. DTT [id number] - REV 01/24.

## 2025-07-16 ENCOUNTER — PATIENT OUTREACH (OUTPATIENT)
Dept: CARE COORDINATION | Facility: CLINIC | Age: 40
End: 2025-07-16
Payer: COMMERCIAL

## 2025-07-16 LAB
HPV HR 12 DNA CVX QL NAA+PROBE: NEGATIVE
HPV16 DNA CVX QL NAA+PROBE: NEGATIVE
HPV18 DNA CVX QL NAA+PROBE: NEGATIVE
HUMAN PAPILLOMA VIRUS FINAL DIAGNOSIS: NORMAL

## 2025-07-21 ENCOUNTER — OFFICE VISIT (OUTPATIENT)
Dept: MIDWIFE SERVICES | Facility: CLINIC | Age: 40
End: 2025-07-21
Attending: PHYSICIAN ASSISTANT
Payer: COMMERCIAL

## 2025-08-23 ENCOUNTER — HOSPITAL ENCOUNTER (EMERGENCY)
Facility: HOSPITAL | Age: 40
Discharge: HOME | End: 2025-08-23
Admitting: EMERGENCY MEDICINE
Payer: COMMERCIAL

## 2025-08-23 ENCOUNTER — APPOINTMENT (OUTPATIENT)
Dept: RADIOLOGY | Facility: HOSPITAL | Age: 40
End: 2025-08-23
Attending: EMERGENCY MEDICINE
Payer: COMMERCIAL

## 2025-08-23 VITALS
RESPIRATION RATE: 18 BRPM | WEIGHT: 170 LBS | HEIGHT: 66 IN | SYSTOLIC BLOOD PRESSURE: 129 MMHG | OXYGEN SATURATION: 100 % | HEART RATE: 76 BPM | DIASTOLIC BLOOD PRESSURE: 73 MMHG | TEMPERATURE: 97.6 F | BODY MASS INDEX: 27.32 KG/M2

## 2025-08-23 DIAGNOSIS — S82.852A CLOSED TRIMALLEOLAR FRACTURE OF LEFT ANKLE, INITIAL ENCOUNTER: Primary | ICD-10-CM

## 2025-08-23 PROCEDURE — 27816 TREATMENT OF ANKLE FRACTURE: CPT | Mod: LT

## 2025-08-23 PROCEDURE — 73610 X-RAY EXAM OF ANKLE: CPT | Mod: LT

## 2025-08-23 PROCEDURE — 73630 X-RAY EXAM OF FOOT: CPT | Mod: LT

## 2025-08-23 PROCEDURE — 99284 EMERGENCY DEPT VISIT MOD MDM: CPT | Mod: 25

## 2025-08-23 PROCEDURE — 99283 EMERGENCY DEPT VISIT LOW MDM: CPT

## 2025-08-23 RX ORDER — OXYCODONE HYDROCHLORIDE 5 MG/1
5 TABLET ORAL EVERY 6 HOURS PRN
Qty: 12 TABLET | Refills: 0 | Status: SHIPPED | OUTPATIENT
Start: 2025-08-23 | End: 2025-08-26

## 2025-08-23 ASSESSMENT — ACTIVITIES OF DAILY LIVING (ADL): ADLS_ACUITY_SCORE: 41

## 2025-08-23 ASSESSMENT — COLUMBIA-SUICIDE SEVERITY RATING SCALE - C-SSRS
2. HAVE YOU ACTUALLY HAD ANY THOUGHTS OF KILLING YOURSELF IN THE PAST MONTH?: NO
1. IN THE PAST MONTH, HAVE YOU WISHED YOU WERE DEAD OR WISHED YOU COULD GO TO SLEEP AND NOT WAKE UP?: NO
6. HAVE YOU EVER DONE ANYTHING, STARTED TO DO ANYTHING, OR PREPARED TO DO ANYTHING TO END YOUR LIFE?: NO

## 2025-08-23 ASSESSMENT — ENCOUNTER SYMPTOMS
HEADACHES: 1
NECK PAIN: 0
ARTHRALGIAS: 1
NAUSEA: 0
BACK PAIN: 0
VOMITING: 0

## 2025-08-26 ENCOUNTER — OFFICE VISIT (OUTPATIENT)
Dept: FAMILY MEDICINE | Facility: CLINIC | Age: 40
End: 2025-08-26
Payer: COMMERCIAL

## 2025-08-26 VITALS
OXYGEN SATURATION: 99 % | TEMPERATURE: 96.7 F | HEIGHT: 66 IN | RESPIRATION RATE: 16 BRPM | SYSTOLIC BLOOD PRESSURE: 110 MMHG | HEART RATE: 81 BPM | DIASTOLIC BLOOD PRESSURE: 63 MMHG | BODY MASS INDEX: 27.32 KG/M2 | WEIGHT: 170 LBS

## 2025-08-26 DIAGNOSIS — Z12.31 VISIT FOR SCREENING MAMMOGRAM: ICD-10-CM

## 2025-08-26 DIAGNOSIS — R73.03 PRE-DIABETES: ICD-10-CM

## 2025-08-26 DIAGNOSIS — Z01.818 PREOP GENERAL PHYSICAL EXAM: Primary | ICD-10-CM

## 2025-08-26 LAB
HGB BLD-MCNC: 11.7 G/DL (ref 11.7–15.7)
MCV RBC AUTO: 88.4 FL (ref 78–100)

## 2025-08-26 ASSESSMENT — PAIN SCALES - GENERAL: PAINLEVEL_OUTOF10: MODERATE PAIN (4)

## 2025-08-27 ENCOUNTER — PATIENT OUTREACH (OUTPATIENT)
Dept: CARE COORDINATION | Facility: CLINIC | Age: 40
End: 2025-08-27
Payer: COMMERCIAL

## 2025-09-01 ENCOUNTER — PATIENT OUTREACH (OUTPATIENT)
Dept: CARE COORDINATION | Facility: CLINIC | Age: 40
End: 2025-09-01
Payer: COMMERCIAL